# Patient Record
Sex: FEMALE | Race: BLACK OR AFRICAN AMERICAN | Employment: FULL TIME | URBAN - METROPOLITAN AREA
[De-identification: names, ages, dates, MRNs, and addresses within clinical notes are randomized per-mention and may not be internally consistent; named-entity substitution may affect disease eponyms.]

---

## 2017-06-06 DIAGNOSIS — E03.9 HYPOTHYROIDISM, UNSPECIFIED TYPE: ICD-10-CM

## 2017-06-06 DIAGNOSIS — E53.8 VITAMIN B12 DEFICIENCY (NON ANEMIC): ICD-10-CM

## 2017-06-06 DIAGNOSIS — E61.1 IRON DEFICIENCY: ICD-10-CM

## 2017-06-06 DIAGNOSIS — M79.89 SWELLING OF RIGHT LOWER EXTREMITY: ICD-10-CM

## 2017-06-06 DIAGNOSIS — E55.9 VITAMIN D DEFICIENCY: ICD-10-CM

## 2017-06-06 DIAGNOSIS — R53.82 CHRONIC FATIGUE: ICD-10-CM

## 2017-06-06 LAB
DEPRECATED CALCIDIOL+CALCIFEROL SERPL-MC: 27 UG/L (ref 20–75)
FERRITIN SERPL-MCNC: 33 NG/ML (ref 12–150)
IRON SATN MFR SERPL: 18 % (ref 15–46)
IRON SERPL-MCNC: 54 UG/DL (ref 35–180)
T4 FREE SERPL-MCNC: 0.82 NG/DL (ref 0.76–1.46)
TIBC SERPL-MCNC: 294 UG/DL (ref 240–430)
TSH SERPL DL<=0.05 MIU/L-ACNC: 2.09 MU/L (ref 0.4–4)
VIT B12 SERPL-MCNC: 365 PG/ML (ref 193–986)

## 2017-06-06 PROCEDURE — 83550 IRON BINDING TEST: CPT | Performed by: INTERNAL MEDICINE

## 2017-06-06 PROCEDURE — 83540 ASSAY OF IRON: CPT | Performed by: INTERNAL MEDICINE

## 2017-06-06 PROCEDURE — 82306 VITAMIN D 25 HYDROXY: CPT | Performed by: INTERNAL MEDICINE

## 2017-06-06 PROCEDURE — 36415 COLL VENOUS BLD VENIPUNCTURE: CPT | Performed by: INTERNAL MEDICINE

## 2017-06-06 PROCEDURE — 82728 ASSAY OF FERRITIN: CPT | Performed by: INTERNAL MEDICINE

## 2017-06-06 PROCEDURE — 84439 ASSAY OF FREE THYROXINE: CPT | Performed by: INTERNAL MEDICINE

## 2017-06-06 PROCEDURE — 82607 VITAMIN B-12: CPT | Performed by: INTERNAL MEDICINE

## 2017-06-06 PROCEDURE — 84443 ASSAY THYROID STIM HORMONE: CPT | Performed by: INTERNAL MEDICINE

## 2017-06-13 ENCOUNTER — OFFICE VISIT (OUTPATIENT)
Dept: INTERNAL MEDICINE | Facility: CLINIC | Age: 37
End: 2017-06-13
Payer: COMMERCIAL

## 2017-06-13 VITALS
TEMPERATURE: 97.8 F | HEIGHT: 67 IN | DIASTOLIC BLOOD PRESSURE: 84 MMHG | BODY MASS INDEX: 23.31 KG/M2 | WEIGHT: 148.5 LBS | HEART RATE: 56 BPM | SYSTOLIC BLOOD PRESSURE: 134 MMHG

## 2017-06-13 DIAGNOSIS — R53.82 CHRONIC FATIGUE: ICD-10-CM

## 2017-06-13 DIAGNOSIS — E53.8 VITAMIN B12 DEFICIENCY (NON ANEMIC): ICD-10-CM

## 2017-06-13 DIAGNOSIS — Z01.84 IMMUNITY STATUS TESTING: ICD-10-CM

## 2017-06-13 DIAGNOSIS — E55.9 VITAMIN D DEFICIENCY: ICD-10-CM

## 2017-06-13 DIAGNOSIS — Z91.148 NONCOMPLIANCE WITH MEDICATION REGIMEN: Primary | ICD-10-CM

## 2017-06-13 DIAGNOSIS — Z86.2 HISTORY OF IRON DEFICIENCY ANEMIA: ICD-10-CM

## 2017-06-13 PROCEDURE — 86762 RUBELLA ANTIBODY: CPT | Performed by: INTERNAL MEDICINE

## 2017-06-13 PROCEDURE — 86735 MUMPS ANTIBODY: CPT | Performed by: INTERNAL MEDICINE

## 2017-06-13 PROCEDURE — 86787 VARICELLA-ZOSTER ANTIBODY: CPT | Performed by: INTERNAL MEDICINE

## 2017-06-13 PROCEDURE — 99215 OFFICE O/P EST HI 40 MIN: CPT | Performed by: INTERNAL MEDICINE

## 2017-06-13 PROCEDURE — 86765 RUBEOLA ANTIBODY: CPT | Performed by: INTERNAL MEDICINE

## 2017-06-13 PROCEDURE — 36415 COLL VENOUS BLD VENIPUNCTURE: CPT | Performed by: INTERNAL MEDICINE

## 2017-06-13 NOTE — PROGRESS NOTES
"  SUBJECTIVE:                                                    Marla Avila is a 37 year old female who presents to clinic today for the following health issues:      Hypothyroidism Follow-up      Since last visit, patient describes the following symptoms: Weight stable, no hair loss, no skin changes, no constipation, no loose stools       Amount of exercise or physical activity: None    Problems taking medications regularly: Yes,  Stop all of her meds in january    Medication side effects: none  Diet: regular (no restrictions)    Of note she has been off her thyroid supplement and meds since about 6 months ago, citing the fact that she lost her meds, though she has refills through to August 2017.    Other significant issues as outlined and addressed in the plan section of this note.  She denies any other concerns or issues at this time.    She however comes in with request to have immunization forms filled. On review of records, she is immune to Hep b but the rest of her immune status is unknown.      Problem list and histories reviewed & adjusted, as indicated.  Additional history: as documented    Labs reviewed in EPIC    Reviewed and updated as needed this visit by clinical staff  Tobacco  Allergies  Meds       Reviewed and updated as needed this visit by Provider         ROS:  14 point ROS negative except as above      OBJECTIVE:                                                    /84  Pulse 56  Temp 97.8  F (36.6  C) (Oral)  Ht 5' 6.6\" (1.692 m)  Wt 148 lb 8 oz (67.4 kg)  LMP 05/07/2017  BMI 23.54 kg/m2  Body mass index is 23.54 kg/(m^2).  GENERAL: healthy, alert and no distress  NECK: no adenopathy, no asymmetry, masses, or scars and thyroid normal to palpation  RESP: lungs clear to auscultation - no rales, rhonchi or wheezes  CV: regular rate and rhythm, normal S1 S2, no S3 or S4, no murmur, click or rub, no peripheral edema and peripheral pulses strong  ABDOMEN: soft, nontender, no " hepatosplenomegaly, no masses and bowel sounds normal  MS: RLE edema, unchanged.    Diagnostic Test Results:  Results for orders placed or performed in visit on 06/06/17   Vitamin D Deficiency   Result Value Ref Range    Vitamin D Deficiency screening 27 20 - 75 ug/L   Ferritin   Result Value Ref Range    Ferritin 33 12 - 150 ng/mL   Vitamin B12   Result Value Ref Range    Vitamin B12 365 193 - 986 pg/mL   Iron and iron binding capacity   Result Value Ref Range    Iron 54 35 - 180 ug/dL    Iron Binding Cap 294 240 - 430 ug/dL    Iron Saturation Index 18 15 - 46 %   TSH   Result Value Ref Range    TSH 2.09 0.40 - 4.00 mU/L   T4 FREE   Result Value Ref Range    T4 Free 0.82 0.76 - 1.46 ng/dL        ASSESSMENT/PLAN:                                                    Hypothyroidism; controlled/euthyroid   Plan:  No changes in the patient's current treatment plan  Will have Marla continue off thyroid supplement for now and recheck labs in 6 months or sooner if she starts to feel fatigued      DIAGNOSIS/PLAN:     ICD-10-CM    1. Noncompliance with medication regimen Z91.14    2. Chronic fatigue R53.82 cholecalciferol 5000 UNITS CAPS     cholecalciferol (VITAMIN D3) 29141 UNITS capsule     Cyanocobalamin (B-12 SUPER STRENGTH) 5000 MCG/ML LIQD     Calcium Carb-Cholecalciferol (CALCIUM 1000 + D) 1000-800 MG-UNIT TABS   3. Vitamin D deficiency E55.9 cholecalciferol 5000 UNITS CAPS     cholecalciferol (VITAMIN D3) 73820 UNITS capsule     Calcium Carb-Cholecalciferol (CALCIUM 1000 + D) 1000-800 MG-UNIT TABS     Vitamin D Deficiency   4. Vitamin B12 deficiency (non anemic) E53.8 Cyanocobalamin (B-12 SUPER STRENGTH) 5000 MCG/ML LIQD     Vitamin B12   5. Immunity status testing Z01.84 Rubella Antibody IgG Quantitative     Rubeola Antibody IgG     Mumps Antibody IgG     Varicella Zoster Virus Antibody IgG   6. History of iron deficiency anemia Z86.2 Ferritin     CBC with platelets     Iron and iron binding capacity        SIGNIFICANT ISSUES RE The primary encounter diagnosis was Noncompliance with medication regimen. Diagnoses of Chronic fatigue, Vitamin D deficiency, Vitamin B12 deficiency (non anemic), Immunity status testing, and History of iron deficiency anemia were also pertinent to this visit. AS NOTED AND ADDRESSED ABOVE   MEDS AND AND LABS AS ORDERED TO ADDRESS PREVIOUS AND CURRENT ABNORMAL INDICES    SEE PATIENT INSTRUCTION SECTION FOR FOLLOW UP PLAN    Marla IS TO CONTINUE OTHER TREATMENT REGIMEN/PLANS EXCEPT AS INDICATED    COMPLIANCE WITH MEDICATIONS DIET AND EXERCISE PLANS ENCOURAGED    DISCONTINUED MEDS:  Medications Discontinued During This Encounter   Medication Reason     benzonatate (TESSALON PERLES) 100 MG capsule Therapy completed     ferrous fumarate 65 mg, [place]. FE,-Vitamin C 125 mg (VITRON C)  MG TABS      cholecalciferol 5000 UNITS CAPS Reorder     cyabnocobalamin (VITAMIN B-12) 2500 MCG sublingual tablet Reorder     levothyroxine (SYNTHROID, LEVOTHROID) 50 MCG tablet      Calcium Carb-Cholecalciferol (CALCIUM 1000 + D) 1000-800 MG-UNIT TABS Reorder       CURRENT MED LIST WITH CHANGES AS NOTED BELOW:  Current Outpatient Prescriptions   Medication Sig Dispense Refill     cholecalciferol 5000 UNITS CAPS Take 1 capsule (5,000 Units) by mouth daily FOR VITAMIN D DEFICIENCY (LOW VITAMIN D) 100 capsule 3     [START ON 6/15/2017] cholecalciferol (VITAMIN D3) 88901 UNITS capsule Take 1 capsule (50,000 Units) by mouth twice a week for 5 doses INDICATION: 2.5 WEEK BOOSTER DOSE TO CORRECT LOW VITAMIN D 5 capsule 0     Cyanocobalamin (B-12 SUPER STRENGTH) 5000 MCG/ML LIQD Place 1 mL under the tongue daily FOR VITAMIN B12 SUPPLEMENTATION, PLEASE PLACE UNDER THE TONGUE 2 Bottle PRN     Calcium Carb-Cholecalciferol (CALCIUM 1000 + D) 1000-800 MG-UNIT TABS Take 1 tablet by mouth daily TAKE WITH FOOD, FOR BONE HEALTH AND LOW VITAMIN D (SUKI MAJANO) 100 tablet PRN         Office visit time > 40  mins, greater than 50% of which was spent obtaining history, reviewing medications, counseling re compliance with treatment plan, discussion of treatment, follow up plans, and coordination of care.       OFFICE VISIT VISIT ACCOMPLISHED WITH THE HELP OF [place]     Patient Instructions   ** FOLLOW UP PLAN**:    PLEASE SCHEDULE FASTING LABS WITH OFFICE VISIT 6 MONTHS FROM TODAY TO FOLLOW UP ON  Chronic fatigue  Vitamin D deficiency  Vitamin B12 deficiency (non anemic)  History of iron deficiency anemia    BE SURE TO SCHEDULE YOUR LAB DRAW APPOINTMENT FOR AT LEAST 5 DAYS BEFORE YOUR NEXT VISIT        YOU MAY CONTACT THE CLINIC IF ANY QUESTIONS OR CONCERNS -[phone] OR VIA [employer]                 Candis Carlos MD  [facility]

## 2017-06-13 NOTE — PATIENT INSTRUCTIONS
** FOLLOW UP PLAN**:    PLEASE SCHEDULE FASTING LABS WITH OFFICE VISIT 6 MONTHS FROM TODAY TO FOLLOW UP ON  Chronic fatigue  Vitamin D deficiency  Vitamin B12 deficiency (non anemic)  History of iron deficiency anemia    BE SURE TO SCHEDULE YOUR LAB DRAW APPOINTMENT FOR AT LEAST 5 DAYS BEFORE YOUR NEXT VISIT        YOU MAY CONTACT THE CLINIC IF ANY QUESTIONS OR CONCERNS -[phone] OR VIA [facility]

## 2017-06-13 NOTE — MR AVS SNAPSHOT
After Visit Summary   6/13/2017    Marla Avila    MRN: [id number]           Patient Information     Date Of Birth          1980        Visit Information        Provider Department      6/13/2017 4:15 PM Candis Carlos MD; [employer] [facility]        Today's Diagnoses     Noncompliance with medication regimen    -  1    Chronic fatigue        Vitamin D deficiency        Vitamin B12 deficiency (non anemic)        Immunity status testing        History of iron deficiency anemia          Care Instructions    ** FOLLOW UP PLAN**:    PLEASE SCHEDULE FASTING LABS WITH OFFICE VISIT 6 MONTHS FROM TODAY TO FOLLOW UP ON  Chronic fatigue  Vitamin D deficiency  Vitamin B12 deficiency (non anemic)  History of iron deficiency anemia    BE SURE TO SCHEDULE YOUR LAB DRAW APPOINTMENT FOR AT LEAST 5 DAYS BEFORE YOUR NEXT VISIT        YOU MAY CONTACT THE CLINIC IF ANY QUESTIONS OR CONCERNS -[phone] OR VIA [employer]                     Follow-ups after your visit        Future tests that were ordered for you today     Open Future Orders        Priority Expected Expires Ordered    Vitamin D Deficiency Routine 6/13/2017 12/11/2017 6/13/2017    Ferritin Routine 6/13/2017 12/11/2017 6/13/2017    CBC with platelets Routine 6/13/2017 12/11/2017 6/13/2017    Iron and iron binding capacity Routine 6/13/2017 12/11/2017 6/13/2017    Vitamin B12 Routine 6/13/2017 12/11/2017 6/13/2017            Who to contact     If you have questions or need follow up information about today's clinic visit or your schedule please contact [facility] directly at [phone].  Normal or non-critical lab and imaging results will be communicated to you by MyChart, letter or phone within 4 business days after the clinic has received the results. If you do not hear from us within 7 days, please contact the clinic through [employer] or phone. If you have a critical or abnormal  "lab result, we will notify you by phone as soon as possible.  Submit refill requests through [employer] or call your pharmacy and they will forward the refill request to us. Please allow 3 business days for your refill to be completed.          Additional Information About Your Visit        Click Contacthart Information     [employer] lets you send messages to your doctor, view your test results, renew your prescriptions, schedule appointments and more. To sign up, go to [email or web address] . Click on \"Log in\" on the left side of the screen, which will take you to the Welcome page. Then click on \"Sign up Now\" on the right side of the page.     You will be asked to enter the access code listed below, as well as some personal information. Please follow the directions to create your username and password.     Your access code is: [email or web address]  Expires: 2017  5:49 PM     Your access code will  in 90 days. If you need help or a new code, please call your [place] clinic or [phone].        Care EveryWhere ID     This is your Care EveryWhere ID. This could be used by other organizations to access your [place] medical records  [id number]        Your Vitals Were     Pulse Temperature Height Last Period BMI (Body Mass Index)       56 97.8  F (36.6  C) (Oral) 5' 6.6\" (1.692 m) 2017 23.54 kg/m2        Blood Pressure from Last 3 Encounters:   17 134/84   16 120/88   16 120/80    Weight from Last 3 Encounters:   17 148 lb 8 oz (67.4 kg)   16 156 lb 8 oz (71 kg)   16 151 lb 6.4 oz (68.7 kg)              We Performed the Following     Mumps Antibody IgG     Rubella Antibody IgG Quantitative     Rubeola Antibody IgG     Varicella Zoster Virus Antibody IgG          Today's Medication Changes          These changes are accurate as of: 17  5:49 PM.  If you have any questions, ask your nurse or doctor.               Start taking these medicines.        Dose/Directions    " Cyanocobalamin 5000 MCG/ML Liqd   Commonly known as:  B-12 SUPER STRENGTH   Used for:  Chronic fatigue, Vitamin B12 deficiency (non anemic)   Replaces:  cyabnocobalamin 2500 MCG sublingual tablet   Started by:  Candis Carlos MD        Dose:  1 mL   Place 1 mL under the tongue daily FOR VITAMIN B12 SUPPLEMENTATION, PLEASE PLACE UNDER THE TONGUE   Quantity:  2 Bottle   Refills:  PRN         These medicines have changed or have updated prescriptions.        Dose/Directions    * cholecalciferol 5000 UNITS Caps   This may have changed:  additional instructions   Used for:  Chronic fatigue, Vitamin D deficiency   Changed by:  Candis Carlos MD        Dose:  1 capsule   Take 1 capsule (5,000 Units) by mouth daily FOR VITAMIN D DEFICIENCY (LOW VITAMIN D)   Quantity:  100 capsule   Refills:  3       * cholecalciferol 15826 UNITS capsule   Commonly known as:  VITAMIN D3   This may have changed:  You were already taking a medication with the same name, and this prescription was added. Make sure you understand how and when to take each.   Used for:  Chronic fatigue, Vitamin D deficiency   Changed by:  Candis Carlos MD        Dose:  1 capsule   Start taking on:  6/15/2017   Take 1 capsule (50,000 Units) by mouth twice a week for 5 doses INDICATION: 2.5 WEEK BOOSTER DOSE TO CORRECT LOW VITAMIN D   Quantity:  5 capsule   Refills:  0       * Notice:  This list has 2 medication(s) that are the same as other medications prescribed for you. Read the directions carefully, and ask your doctor or other care provider to review them with you.      Stop taking these medicines if you haven't already. Please contact your care team if you have questions.     cyabnocobalamin 2500 MCG sublingual tablet   Commonly known as:  VITAMIN B-12   Replaced by:  Cyanocobalamin 5000 MCG/ML Liqd   Stopped by:  Candis Carlos MD           ferrous fumarate 65 mg ([place]. FE)-Vitamin C 125 mg  MG Tabs tablet   Commonly known as:   VITRON C   Stopped by:  Candis Carlos MD           levothyroxine 50 MCG tablet   Commonly known as:  SYNTHROID/LEVOTHROID   Stopped by:  Candis Carlos MD                Where to get your medicines      These medications were sent to [facility] [address]  [address] [address]     Phone:  [phone]     Calcium Carb-Cholecalciferol 1000-800 MG-UNIT Tabs    cholecalciferol 5000 UNITS Caps    cholecalciferol 02646 UNITS capsule    Cyanocobalamin 5000 MCG/ML Liqd                Primary Care Provider Office Phone # Fax #    Candis Carols [phone]       [facility] [address] [facility] [address]        Thank you!     Thank you for choosing [facility]  for your care. Our goal is always to provide you with excellent care. Hearing back from our patients is one way we can continue to improve our services. Please take a few minutes to complete the written survey that you may receive in the mail after your visit with us. Thank you!             Your Updated Medication List - Protect others around you: Learn how to safely use, store and throw away your medicines at www.disposemymeds.org.          This list is accurate as of: 6/13/17  5:49 PM.  Always use your most recent med list.                   Brand Name Dispense Instructions for use    Calcium Carb-Cholecalciferol 1000-800 MG-UNIT Tabs    CALCIUM 1000 + D    100 tablet    Take 1 tablet by mouth daily TAKE WITH FOOD, FOR BONE HEALTH AND LOW VITAMIN D ([facility])       * cholecalciferol 5000 UNITS Caps     100 capsule    Take 1 capsule (5,000 Units) by mouth daily FOR VITAMIN D DEFICIENCY (LOW VITAMIN D)       * cholecalciferol 27414 UNITS capsule   Start taking on:  6/15/2017    VITAMIN D3    5 capsule    Take 1 capsule (50,000 Units) by mouth twice a week for 5 doses INDICATION: 2.5 WEEK BOOSTER DOSE TO CORRECT LOW  VITAMIN D       Cyanocobalamin 5000 MCG/ML Liqd    B-12 SUPER STRENGTH    2 Bottle    Place 1 mL under the tongue daily FOR VITAMIN B12 SUPPLEMENTATION, PLEASE PLACE UNDER THE TONGUE       * Notice:  This list has 2 medication(s) that are the same as other medications prescribed for you. Read the directions carefully, and ask your doctor or other care provider to review them with you.

## 2017-06-15 LAB
MEV IGG SER QL IA: 4.7 AI (ref 0–0.8)
MUV IGG SER QL IA: 3.9 AI (ref 0–0.8)
RUBV IGG SERPL IA-ACNC: 72 IU/ML
VZV IGG SER QL IA: 2.8 AI (ref 0–0.8)

## 2017-06-26 ENCOUNTER — TELEPHONE (OUTPATIENT)
Dept: INTERNAL MEDICINE | Facility: CLINIC | Age: 37
End: 2017-06-26

## 2017-06-26 NOTE — TELEPHONE ENCOUNTER
Reason for call:  Other   Patient called regarding (reason for call): Wants to get a school physical before July for school, did explain to her we have no opening, but we could do the TB test a nurse only, wants to only see dr vinson, doesn't want to wait till sept. To be seen  Additional comments: Please call patient to discuss this with her, as she did not want to hear what I told her.     Phone number to reach patient:  Home number on file [phone] (home)    Best Time:  anytime    Can we leave a detailed message on this number?  YES

## 2017-07-03 ENCOUNTER — OFFICE VISIT (OUTPATIENT)
Dept: INTERNAL MEDICINE | Facility: CLINIC | Age: 37
End: 2017-07-03
Payer: COMMERCIAL

## 2017-07-03 ENCOUNTER — RADIANT APPOINTMENT (OUTPATIENT)
Dept: GENERAL RADIOLOGY | Facility: CLINIC | Age: 37
End: 2017-07-03
Attending: INTERNAL MEDICINE
Payer: COMMERCIAL

## 2017-07-03 VITALS
BODY MASS INDEX: 25.3 KG/M2 | DIASTOLIC BLOOD PRESSURE: 78 MMHG | RESPIRATION RATE: 20 BRPM | OXYGEN SATURATION: 99 % | TEMPERATURE: 97.8 F | WEIGHT: 157.4 LBS | HEIGHT: 66 IN | HEART RATE: 87 BPM | SYSTOLIC BLOOD PRESSURE: 128 MMHG

## 2017-07-03 DIAGNOSIS — Z11.1 SCREENING EXAMINATION FOR PULMONARY TUBERCULOSIS: ICD-10-CM

## 2017-07-03 DIAGNOSIS — Z00.00 ENCOUNTER FOR ROUTINE ADULT HEALTH EXAMINATION WITHOUT ABNORMAL FINDINGS: Primary | ICD-10-CM

## 2017-07-03 PROCEDURE — 99395 PREV VISIT EST AGE 18-39: CPT | Performed by: INTERNAL MEDICINE

## 2017-07-03 PROCEDURE — 71020 XR CHEST 2 VW: CPT

## 2017-07-03 NOTE — MR AVS SNAPSHOT
After Visit Summary   7/3/2017    Marla Avila    MRN: [id number]           Patient Information     Date Of Birth          1980        Visit Information        Provider Department      7/3/2017 10:45 AM Candis Carlos MD; [employer] [employer] [facility]        Today's Diagnoses     Encounter for routine adult health examination without abnormal findings    -  1    Screening examination for pulmonary tuberculosis          Care Instructions    FOLLOW UP FOR ROUTINE CARE AS SCHEDULED      Preventive Health Recommendations  Female Ages 26 - 39  Yearly exam:   See your health care provider every year in order to    Review health changes.     Discuss preventive care.      Review your medicines if you your doctor has prescribed any.    Until age 30: Get a Pap test every three years (more often if you have had an abnormal result).    After age 30: Talk to your doctor about whether you should have a Pap test every 3 years or have a Pap test with HPV screening every 5 years.   You do not need a Pap test if your uterus was removed (hysterectomy) and you have not had cancer.  You should be tested each year for STDs (sexually transmitted diseases), if you're at risk.   Talk to your provider about how often to have your cholesterol checked.  If you are at risk for diabetes, you should have a diabetes test (fasting glucose).  Shots: Get a flu shot each year. Get a tetanus shot every 10 years.   Nutrition:     Eat at least 5 servings of fruits and vegetables each day.    Eat whole-grain bread, whole-wheat pasta and brown rice instead of white grains and rice.    Talk to your provider about Calcium and Vitamin D.     Lifestyle    Exercise at least 150 minutes a week (30 minutes a day, 5 days of the week). This will help you control your weight and prevent disease.    Limit alcohol to one drink per day.    No smoking.     Wear sunscreen to prevent skin cancer.    See your  "dentist every six months for an exam and cleaning.            Follow-ups after your visit        Who to contact     If you have questions or need follow up information about today's clinic visit or your schedule please contact [facility] directly at [phone].  Normal or non-critical lab and imaging results will be communicated to you by MyChart, letter or phone within 4 business days after the clinic has received the results. If you do not hear from us within 7 days, please contact the clinic through MyChart or phone. If you have a critical or abnormal lab result, we will notify you by phone as soon as possible.  Submit refill requests through [employer] or call your pharmacy and they will forward the refill request to us. Please allow 3 business days for your refill to be completed.          Additional Information About Your Visit        MyChart Information     [employer] lets you send messages to your doctor, view your test results, renew your prescriptions, schedule appointments and more. To sign up, go to [email or web address] . Click on \"Log in\" on the left side of the screen, which will take you to the Welcome page. Then click on \"Sign up Now\" on the right side of the page.     You will be asked to enter the access code listed below, as well as some personal information. Please follow the directions to create your username and password.     Your access code is: [email or web address]  Expires: 2017  5:49 PM     Your access code will  in 90 days. If you need help or a new code, please call your [place] clinic or [phone].        Care EveryWhere ID     This is your Care EveryWhere ID. This could be used by other organizations to access your [place] medical records  [id number]        Your Vitals Were     Pulse Temperature Respirations Height Last Period Pulse Oximetry    87 97.8  F (36.6  C) (Oral) 20 5' 6\" (1.676 m) 2017 99%    BMI (Body Mass Index)                   25.41 " kg/m2            Blood Pressure from Last 3 Encounters:   07/03/17 128/78   06/13/17 134/84   09/29/16 120/88    Weight from Last 3 Encounters:   07/03/17 157 lb 6.4 oz (71.4 kg)   06/13/17 148 lb 8 oz (67.4 kg)   09/29/16 156 lb 8 oz (71 kg)              We Performed the Following     TB INTRADERMAL TEST        Primary Care Provider Office Phone # Fax #    Candis Carlos [phone]       [facility] [address] [facility] [address]        Equal Access to Services     [facility] : Hadii micah Lozano, waaxda enrico, qaybta kaalmada celina, levi correia . So [facility] [phone].    ATENCIÓN: Si habla español, tiene a mcgraw disposición servicios gratuitos de asistencia lingüística. Llame al [phone].    We comply with applicable federal civil rights laws and [place] laws. We do not discriminate on the basis of race, color, national origin, age, disability sex, sexual orientation or gender identity.            Thank you!     Thank you for choosing [facility]  for your care. Our goal is always to provide you with excellent care. Hearing back from our patients is one way we can continue to improve our services. Please take a few minutes to complete the written survey that you may receive in the mail after your visit with us. Thank you!             Your Updated Medication List - Protect others around you: Learn how to safely use, store and throw away your medicines at www.disposemymeds.org.          This list is accurate as of: 7/3/17 12:05 PM.  Always use your most recent med list.                   Brand Name Dispense Instructions for use Diagnosis    Calcium Carb-Cholecalciferol 1000-800 MG-UNIT Tabs    CALCIUM 1000 + D    100 tablet    Take 1 tablet by mouth daily TAKE WITH FOOD, FOR BONE HEALTH AND LOW VITAMIN D (SAUD MAJANO)    Chronic fatigue, Vitamin D deficiency       cholecalciferol 5000  UNITS Caps     100 capsule    Take 1 capsule (5,000 Units) by mouth daily FOR VITAMIN D DEFICIENCY (LOW VITAMIN D)    Chronic fatigue, Vitamin D deficiency       Cyanocobalamin 5000 MCG/ML Liqd    B-12 SUPER STRENGTH    2 Bottle    Place 1 mL under the tongue daily FOR VITAMIN B12 SUPPLEMENTATION, PLEASE PLACE UNDER THE TONGUE    Chronic fatigue, Vitamin B12 deficiency (non anemic)

## 2017-07-03 NOTE — PATIENT INSTRUCTIONS
FOLLOW UP FOR ROUTINE CARE AS SCHEDULED      Preventive Health Recommendations  Female Ages 26 - 39  Yearly exam:   See your health care provider every year in order to    Review health changes.     Discuss preventive care.      Review your medicines if you your doctor has prescribed any.    Until age 30: Get a Pap test every three years (more often if you have had an abnormal result).    After age 30: Talk to your doctor about whether you should have a Pap test every 3 years or have a Pap test with HPV screening every 5 years.   You do not need a Pap test if your uterus was removed (hysterectomy) and you have not had cancer.  You should be tested each year for STDs (sexually transmitted diseases), if you're at risk.   Talk to your provider about how often to have your cholesterol checked.  If you are at risk for diabetes, you should have a diabetes test (fasting glucose).  Shots: Get a flu shot each year. Get a tetanus shot every 10 years.   Nutrition:     Eat at least 5 servings of fruits and vegetables each day.    Eat whole-grain bread, whole-wheat pasta and brown rice instead of white grains and rice.    Talk to your provider about Calcium and Vitamin D.     Lifestyle    Exercise at least 150 minutes a week (30 minutes a day, 5 days of the week). This will help you control your weight and prevent disease.    Limit alcohol to one drink per day.    No smoking.     Wear sunscreen to prevent skin cancer.    See your dentist every six months for an exam and cleaning.

## 2017-07-03 NOTE — NURSING NOTE
"Chief Complaint   Patient presents with     Physical     school       Initial /78  Pulse 87  Temp 97.8  F (36.6  C) (Oral)  Resp 20  Ht 5' 6\" (1.676 m)  Wt 157 lb 6.4 oz (71.4 kg)  LMP 05/07/2017  SpO2 99%  BMI 25.41 kg/m2 Estimated body mass index is 25.41 kg/(m^2) as calculated from the following:    Height as of this encounter: 5' 6\" (1.676 m).    Weight as of this encounter: 157 lb 6.4 oz (71.4 kg).  Blood pressure completed using cuff size: regular    "

## 2017-07-03 NOTE — PROGRESS NOTES
"   SUBJECTIVE:   CC: Marla Avila is an 37 year old woman who presents for preventive health visit.     Healthy Habits:    Do you get at least three servings of calcium containing foods daily (dairy, green leafy vegetables, etc.)? no    Amount of exercise or daily activities, outside of work: 7 day(s) per week    Problems taking medications regularly No    Medication side effects: No    Have you had an eye exam in the past two years? yes    Do you see a dentist twice per year? no    Do you have sleep apnea, excessive snoring or daytime drowsiness?no        She denies any other concerns or issues at this time.      Immunity: status is unknown.      Today's PHQ-2 Score:   PHQ-2 ( 1999 Pf[employer]) 6/13/2017 8/26/2016   Q1: Little interest or pleasure in doing things 0 0   Q2: Feeling down, depressed or hopeless 0 0   PHQ-2 Score 0 0         Abuse: Current or Past(Physical, Sexual or Emotional)- No  Do you feel safe in your environment - Yes    Social History   Substance Use Topics     Smoking status: Never Smoker     Smokeless tobacco: Never Used     Alcohol use No     none    Reviewed orders with patient.  Reviewed health maintenance and updated orders accordingly - Yes  Labs reviewed in EPIC    Mammogram not appropriate for this patient based on age.    Pertinent mammograms are reviewed under the imaging tab.  History of abnormal Pap smear: NO - age 30- 65 PAP every 3 years recommended    Reviewed and updated as needed this visit by clinical staff  Tobacco  Allergies  Meds  Med Hx  Surg Hx  Fam Hx  Soc Hx        Reviewed and updated as needed this visit by Provider        Past Medical History:   Diagnosis Date     Hypothyroidism      Low back pain      Lymphedema         ROS:  14 point ROS negative except as above      OBJECTIVE:   /78  Pulse 87  Temp 97.8  F (36.6  C) (Oral)  Resp 20  Ht 5' 6\" (1.676 m)  Wt 157 lb 6.4 oz (71.4 kg)  LMP 05/07/2017  SpO2 99%  BMI 25.41 kg/m2  EXAM:  GENERAL: healthy, " alert and no distress  EYES: Eyes grossly normal to inspection, PERRL and conjunctivae and sclerae normal  NECK: no adenopathy, no asymmetry, masses, or scars and thyroid normal to palpation  RESP: lungs clear to auscultation - no rales, rhonchi or wheezes  CV: regular rate and rhythm, normal S1 S2, no S3 or S4, no murmur, click or rub, no peripheral edema and peripheral pulses strong  ABDOMEN: soft, nontender, no hepatosplenomegaly, no masses and bowel sounds normal  MS: no gross musculoskeletal defects noted, no edema    ASSESSMENT/PLAN:     PREVENTATIVE:   The importance of routine health maintenance including exercise, healthy eating habits, maintaining good mental, sexual health, SBE and need for regular pap smears as indicated is emphasized.    PROBLEM FOCUSED:    ICD-10-CM    1. Encounter for routine adult health examination without abnormal findings Z00.00    2. Screening examination for pulmonary tuberculosis Z11.1 XR Chest 2 Views     CANCELED: TB INTRADERMAL TEST       SIGNIFICANT ISSUES RE The primary encounter diagnosis was Encounter for routine adult health examination without abnormal findings. A diagnosis of Screening examination for pulmonary tuberculosis was also pertinent to this visit. AS NOTED AND ADDRESSED ABOVE   MEDS AND LABS AS ORDERED TO ADDRESS PREVIOUSLY ABNORMAL INDICES    SEE PATIENT INSTRUCTION SECTION FOR FOLLOW UP PLAN    PT TO CONTINUE OTHER TREATMENT REGIMEN/PLANS EXCEPT AS INDICATED    COMPLIANCE WITH MEDICATIONS DIET AND EXERCISE PLANS ENCOURAGED    DISCONTINUED MEDS:  There are no discontinued medications.    CURRENT MED LIST WITH CHANGES AS NOTED BELOW:  Current Outpatient Prescriptions   Medication Sig Dispense Refill     cholecalciferol 5000 UNITS CAPS Take 1 capsule (5,000 Units) by mouth daily FOR VITAMIN D DEFICIENCY (LOW VITAMIN D) 100 capsule 3     Cyanocobalamin (B-12 SUPER STRENGTH) 5000 MCG/ML LIQD Place 1 mL under the tongue daily FOR VITAMIN B12 SUPPLEMENTATION,  "PLEASE PLACE UNDER THE TONGUE 2 Bottle PRN     Calcium Carb-Cholecalciferol (CALCIUM 1000 + D) 1000-800 MG-UNIT TABS Take 1 tablet by mouth daily TAKE WITH FOOD, FOR BONE HEALTH AND LOW VITAMIN D (SAUD MAJANO) 100 tablet PRN         COUNSELING:   Reviewed preventive health counseling, as reflected in patient instructions       Regular exercise       Healthy diet/nutrition         reports that she has never smoked. She has never used smokeless tobacco.    Estimated body mass index is 25.41 kg/(m^2) as calculated from the following:    Height as of this encounter: 5' 6\" (1.676 m).    Weight as of this encounter: 157 lb 6.4 oz (71.4 kg).       Counseling Resources:  ATP IV Guidelines  Pooled Cohorts Equation Calculator  Breast Cancer Risk Calculator  FRAX Risk Assessment  ICSI Preventive Guidelines  Dietary Guidelines for Americans, 2010  USDA's MyPlate  ASA Prophylaxis  Lung CA Screening    Candis Carlos MD  [facility]  "

## 2017-11-15 ENCOUNTER — OFFICE VISIT (OUTPATIENT)
Dept: INTERNAL MEDICINE | Facility: CLINIC | Age: 37
End: 2017-11-15
Payer: COMMERCIAL

## 2017-11-15 VITALS
DIASTOLIC BLOOD PRESSURE: 70 MMHG | TEMPERATURE: 98.2 F | WEIGHT: 161 LBS | BODY MASS INDEX: 25.99 KG/M2 | OXYGEN SATURATION: 98 % | RESPIRATION RATE: 16 BRPM | SYSTOLIC BLOOD PRESSURE: 114 MMHG | HEART RATE: 62 BPM

## 2017-11-15 DIAGNOSIS — N91.2 AMENORRHEA: ICD-10-CM

## 2017-11-15 DIAGNOSIS — R19.00 ABDOMINAL SWELLING: Primary | ICD-10-CM

## 2017-11-15 LAB
BETA HCG QUAL IFA URINE: NEGATIVE
ERYTHROCYTE [DISTWIDTH] IN BLOOD BY AUTOMATED COUNT: 12.5 % (ref 10–15)
HCT VFR BLD AUTO: 42.2 % (ref 35–47)
HGB BLD-MCNC: 14.1 G/DL (ref 11.7–15.7)
MCH RBC QN AUTO: 30.1 PG (ref 26.5–33)
MCHC RBC AUTO-ENTMCNC: 33.4 G/DL (ref 31.5–36.5)
MCV RBC AUTO: 90 FL (ref 78–100)
PLATELET # BLD AUTO: 156 10E9/L (ref 150–450)
RBC # BLD AUTO: 4.68 10E12/L (ref 3.8–5.2)
WBC # BLD AUTO: 4 10E9/L (ref 4–11)

## 2017-11-15 PROCEDURE — 85027 COMPLETE CBC AUTOMATED: CPT | Performed by: PHYSICIAN ASSISTANT

## 2017-11-15 PROCEDURE — 36415 COLL VENOUS BLD VENIPUNCTURE: CPT | Performed by: PHYSICIAN ASSISTANT

## 2017-11-15 PROCEDURE — 84703 CHORIONIC GONADOTROPIN ASSAY: CPT | Performed by: PHYSICIAN ASSISTANT

## 2017-11-15 PROCEDURE — 99214 OFFICE O/P EST MOD 30 MIN: CPT | Performed by: PHYSICIAN ASSISTANT

## 2017-11-15 PROCEDURE — 80053 COMPREHEN METABOLIC PANEL: CPT | Performed by: PHYSICIAN ASSISTANT

## 2017-11-15 NOTE — NURSING NOTE
"Chief Complaint   Patient presents with     Musculoskeletal Problem       Initial /70 (BP Location: Left arm, Cuff Size: Adult Regular)  Pulse 62  Temp 98.2  F (36.8  C) (Oral)  Resp 16  Wt 161 lb (73 kg)  SpO2 98%  BMI 25.99 kg/m2 Estimated body mass index is 25.99 kg/(m^2) as calculated from the following:    Height as of 7/3/17: 5' 6\" (1.676 m).    Weight as of this encounter: 161 lb (73 kg).  Medication Reconciliation: complete   Maryan Hernández MA      "

## 2017-11-15 NOTE — PROGRESS NOTES
"  SUBJECTIVE:   Marla Avila is a 37 year old female who presents to clinic today for the following health issues:      Leg pain      Duration: 5 years    Description (location/character/radiation): swelling in right leg     Intensity:  moderate    Accompanying signs and symptoms: discomfort in right hip x2 weeks    History (similar episodes/previous evaluation): multiple scans of leg taht have showed nothing concerning     Precipitating or alleviating factors: bending    Therapies tried and outcome: None   Pt notes the leg swelling is not new- it is the same that it has been for years.  She notes she now has new swelling on the right side of her abdomen.   She denies that it is painful.  Pt also notes she has not had a period since May.  She had a pregnancy test at an outside facility in October that was negative.   Pt reports a 10 lb weight gain.   Pt notes some constipation.   She denies urinary symptoms.   She has no further concerns for today's visit.       Summary from last vascular visit in June 2016  \"1. Lymphedema of right lower extremity:  On review of pt's chart she has been seen by vascular in the past for right lymphedema.  She was seen in June 2016.   She has a unilateral right lower extremity swelling with 1+ pitting edema .clinical exam consistent with lymphedema .  Loss of leg Contour and buffalo hump of dorsum of right foot .squaring of the toes and positive stammer sign .  No varicose veins .She  Was born and brought up in [facility] .no family history for congenital lymphedema .no constitutional symptoms .previous right lower extremity venous Dopplers negative for DVT and abdominal and pelvic ultrasound was unremarkable .Two days ago CT scan of the abdomen and pelvis negative. No injury to the leg .  Compression stockings suggested and arrange referral to seen lymphedema clinic . Repeat  right lower extremity venous Dopplers negative for DVT     Plan:  Continue Rx with lymphedema clinic   " "lymphoscinography  Referral to PMR Dr. Cavanaugh .  Use compression , elevate leg\"    Problem list and histories reviewed & adjusted, as indicated.  Additional history: as documented    Reviewed and updated as needed this visit by clinical staffTobacco  Allergies  Meds  Problems       Reviewed and updated as needed this visit by Provider  Allergies  Meds  Problems           OBJECTIVE:   /70 (BP Location: Left arm, Cuff Size: Adult Regular)  Pulse 62  Temp 98.2  F (36.8  C) (Oral)  Resp 16  Wt 161 lb (73 kg)  SpO2 98%  BMI 25.99 kg/m2  Body mass index is 25.99 kg/(m^2).  GENERAL: healthy, alert and no distress  CV: regular rates and rhythm, normal S1 S2, no S3 or S4 and no murmur, click or rub  ABDOMEN:   Swelling noted on right suprapubic abdomen -distinct to area from pubic bone to ASIS with a width of several intact- soft and non-tender  Otherwise abdomen is somewhat firm, nontender, no hepatosplenomegaly, no masses and bowel sounds normal  Right Lower extremity with 1+ pitting edema without varicosities, erythema, warmth or pain.     ASSESSMENT/PLAN:     36 yo F with history of lymphedema on the right leg who has seen vascular with complete evaluation who presents with right-sided abdominal swelling without tenderness or other accompanying symptoms.    1. Abdominal swelling  2. Amenorrhea  - US Abdomen Complete; Future  - CBC with platelets  - Comprehensive metabolic panel  - Beta HCG qual IFA urine      Prema DONTRELL Crowder PA-C  [facility]    "

## 2017-11-16 LAB
ALBUMIN SERPL-MCNC: 4.1 G/DL (ref 3.4–5)
ALP SERPL-CCNC: 79 U/L (ref 40–150)
ALT SERPL W P-5'-P-CCNC: 18 U/L (ref 0–50)
ANION GAP SERPL CALCULATED.3IONS-SCNC: 6 MMOL/L (ref 3–14)
AST SERPL W P-5'-P-CCNC: 13 U/L (ref 0–45)
BILIRUB SERPL-MCNC: 0.4 MG/DL (ref 0.2–1.3)
BUN SERPL-MCNC: 14 MG/DL (ref 7–30)
CALCIUM SERPL-MCNC: 9.8 MG/DL (ref 8.5–10.1)
CHLORIDE SERPL-SCNC: 105 MMOL/L (ref 94–109)
CO2 SERPL-SCNC: 29 MMOL/L (ref 20–32)
CREAT SERPL-MCNC: 0.65 MG/DL (ref 0.52–1.04)
GFR SERPL CREATININE-BSD FRML MDRD: >90 ML/MIN/1.7M2
GLUCOSE SERPL-MCNC: 97 MG/DL (ref 70–99)
POTASSIUM SERPL-SCNC: 3.7 MMOL/L (ref 3.4–5.3)
PROT SERPL-MCNC: 8 G/DL (ref 6.8–8.8)
SODIUM SERPL-SCNC: 140 MMOL/L (ref 133–144)

## 2017-11-17 ENCOUNTER — TELEPHONE (OUTPATIENT)
Dept: INTERNAL MEDICINE | Facility: CLINIC | Age: 37
End: 2017-11-17

## 2017-11-17 DIAGNOSIS — R19.8 FULLNESS OF ABDOMEN: Primary | ICD-10-CM

## 2017-11-17 NOTE — TELEPHONE ENCOUNTER
Pt calling waiting for U/S to be ordered and results . Gave results since normal and reordered ultrasound at hospital . If meant to have here at clinic . Please call pt .Anjali Jama RN

## 2017-11-27 ENCOUNTER — HOSPITAL ENCOUNTER (OUTPATIENT)
Dept: ULTRASOUND IMAGING | Facility: CLINIC | Age: 37
Discharge: HOME | End: 2017-11-27
Attending: PHYSICIAN ASSISTANT | Admitting: PHYSICIAN ASSISTANT
Payer: COMMERCIAL

## 2017-11-27 DIAGNOSIS — R19.8 FULLNESS OF ABDOMEN: ICD-10-CM

## 2017-11-27 PROCEDURE — 76700 US EXAM ABDOM COMPLETE: CPT

## 2018-12-20 ENCOUNTER — OFFICE VISIT (OUTPATIENT)
Dept: FAMILY MEDICINE | Facility: CLINIC | Age: 38
End: 2018-12-20

## 2018-12-20 VITALS
DIASTOLIC BLOOD PRESSURE: 83 MMHG | WEIGHT: 158 LBS | TEMPERATURE: 98.7 F | HEART RATE: 75 BPM | SYSTOLIC BLOOD PRESSURE: 136 MMHG | OXYGEN SATURATION: 99 % | BODY MASS INDEX: 25.39 KG/M2 | HEIGHT: 66 IN

## 2018-12-20 DIAGNOSIS — J02.9 SORE THROAT: Primary | ICD-10-CM

## 2018-12-20 DIAGNOSIS — J02.0 STREPTOCOCCAL PHARYNGITIS: ICD-10-CM

## 2018-12-20 LAB
DEPRECATED S PYO AG THROAT QL EIA: ABNORMAL
SPECIMEN SOURCE: ABNORMAL

## 2018-12-20 PROCEDURE — 87880 STREP A ASSAY W/OPTIC: CPT | Performed by: FAMILY MEDICINE

## 2018-12-20 PROCEDURE — 99213 OFFICE O/P EST LOW 20 MIN: CPT | Performed by: FAMILY MEDICINE

## 2018-12-20 RX ORDER — AMOXICILLIN 875 MG
875 TABLET ORAL 2 TIMES DAILY
Qty: 20 TABLET | Refills: 0 | Status: SHIPPED | OUTPATIENT
Start: 2018-12-20 | End: 2018-12-30

## 2018-12-20 ASSESSMENT — MIFFLIN-ST. JEOR: SCORE: 1413.43

## 2018-12-20 NOTE — PATIENT INSTRUCTIONS
Patient Education     Pharyngitis: Strep (Confirmed)    You have had a positive test for strep throat. Strep throat is a contagious illness. It is spread by coughing, kissing or by touching others after touching your mouth or nose. Symptoms include throat pain that is worse with swallowing, aching all over, headache, and fever. It is treated with antibiotic medicine. This should help you start to feel better in 1 to 2 days.  Home care    Rest at home. Drink plenty of fluids to you won't get dehydrated.    No work or school for the first 2 days of taking the antibiotics. After this time, you will not be contagious. You can then return to school or work if you are feeling better.     Take antibiotic medicine for the full 10 days, even if you feel better. This is very important to ensure the infection is treated. It is also important to prevent medicine-resistant germs from developing. If you were given an antibiotic shot, you don't need any more antibiotics.    You may use acetaminophen or ibuprofen to control pain or fever, unless another medicine was prescribed for this. Talk with your healthcare provider before taking these medicines if you have chronic liver or kidney disease. Also talk with your healthcare provider if you have had a stomach ulcer or GI bleeding.    Throat lozenges or sprays help reduce pain. Gargling with warm saltwater will also reduce throat pain. Dissolve 1/2 teaspoon of salt in 1 glass of warm water. This may be useful just before meals.     Soft foods are OK. Don't eat salty or spicy foods.  Follow-up care  Follow up with your healthcare provider or our staff if you don't get better over the next week.  When to seek medical advice  Call your healthcare provider right away if any of these occur:    Fever of 100.4 F (38 C) or higher, or as directed by your healthcare provider    New or worsening ear pain, sinus pain, or headache    Painful lumps in the back of neck    Stiff neck    Lymph  nodes getting larger or becoming soft in the middle    You can't swallow liquids or you can't open your mouth wide because of throat pain    Signs of dehydration. These include very dark urine or no urine, sunken eyes, and dizziness.    Trouble breathing or noisy breathing    Muffled voice    Rash  Prevention  Here are steps you can take to help prevent an infection:    Keep good hand washing habits.    Don t have close contact with people who have sore throats, colds, or other upper respiratory infections.    Don t smoke, and stay away from secondhand smoke.  Date Last Reviewed: 11/1/2017 2000-2018 [employer]. [address] [place] [address], [place] [address]. All rights reserved. This information is not intended as a substitute for professional medical care. Always follow your healthcare professional's instructions.

## 2018-12-20 NOTE — PROGRESS NOTES
"  SUBJECTIVE:   Marla Avila is a 38 year old female who presents to clinic today for the following health issues:      Acute Illness   Acute illness concerns: Pharyngitis   Onset: x 1 week     Fever: last week better now     Chills/Sweats: no     Headache (location?): no     Sinus Pressure:no    Conjunctivitis:  no    Ear Pain: YES- right     Rhinorrhea: no    Congestion: YES    Sore Throat: YES     Cough: no    Wheeze: no     Decreased Appetite: no     Nausea: no     Vomiting: no     Diarrhea:  no     Dysuria/Freq.: no     Fatigue/Achiness: no     Sick/Strep Exposure: no      Therapies Tried and outcome:       PROBLEMS TO ADD ON...    Problem list and histories reviewed & adjusted, as indicated.  Additional history: as documented    Patient Active Problem List   Diagnosis     Anovulation     Low back pain     Lumbago     Swelling of right lower extremity     Chronic fatigue     Vitamin D deficiency     Vitamin B12 deficiency (non anemic)     Myalgia     CARDIOVASCULAR SCREENING; LDL GOAL LESS THAN 160     History of iron deficiency anemia     Primary hypothyroidism     Iron deficiency     Non-compliance with treatment     Past Surgical History:   Procedure Laterality Date     NO         Social History     Tobacco Use     Smoking status: Never Smoker     Smokeless tobacco: Never Used   Substance Use Topics     Alcohol use: No     Alcohol/week: 0.0 oz     Family History   Problem Relation Age of Onset     Hypertension Paternal Grandfather            Reviewed and updated as needed this visit by clinical staff       Reviewed and updated as needed this visit by Provider         ROS:  Constitutional, HEENT, cardiovascular, pulmonary, GI, , musculoskeletal, neuro, skin, endocrine and psych systems are negative, except as otherwise noted.    OBJECTIVE:     /83   Pulse 75   Temp 98.7  F (37.1  C) (Tympanic)   Ht 1.676 m (5' 6\")   Wt 71.7 kg (158 lb)   LMP 05/20/2017   SpO2 99%   BMI 25.50 kg/m    Body mass " index is 25.5 kg/m .  GENERAL: healthy, alert and no distress  EYES: Eyes grossly normal to inspection,   HENT: ear canals and TM's normal and oral mucous membranes moist. Throat with mild pharyngeal erythema slight exudate on tonsils but not too enlarged , no sinus  tenderness  NECK: no adenopathy,   RESP: lungs clear to auscultation - no rales, rhonchi or wheezes  CV: regular rate and rhythm, normal S1 S2, no S3 or S4,       Diagnostic Test Results:  Strep screen - Positive    ASSESSMENT/PLAN:         (J02.9) Sore throat  (primary encounter diagnosis)  Comment:   Plan: Strep, Rapid Screen            (J02.0) Streptococcal pharyngitis  Comment:   Plan: amoxicillin (AMOXIL) 875 MG tablet              Rapid strep positive. Script faxed. Call and treat only if positive. In the meantime cares and symptomatic treatment discussed follow up if problem       Patient expressed understanding and agreement with treatment plan. All patient's questions were answered, will let me know if has more later.  Medications: Rx's: Reviewed the potential side effects/complications of medications prescribed.     Cely Good MD  [facility] [place]

## 2019-01-15 ENCOUNTER — OFFICE VISIT (OUTPATIENT)
Dept: FAMILY MEDICINE | Facility: CLINIC | Age: 39
End: 2019-01-15

## 2019-01-15 VITALS
BODY MASS INDEX: 26.2 KG/M2 | HEIGHT: 66 IN | TEMPERATURE: 98.4 F | OXYGEN SATURATION: 99 % | HEART RATE: 83 BPM | DIASTOLIC BLOOD PRESSURE: 88 MMHG | WEIGHT: 163 LBS | SYSTOLIC BLOOD PRESSURE: 130 MMHG

## 2019-01-15 DIAGNOSIS — J02.9 SORE THROAT: Primary | ICD-10-CM

## 2019-01-15 LAB
DEPRECATED S PYO AG THROAT QL EIA: NORMAL
SPECIMEN SOURCE: NORMAL

## 2019-01-15 PROCEDURE — 99213 OFFICE O/P EST LOW 20 MIN: CPT | Performed by: FAMILY MEDICINE

## 2019-01-15 PROCEDURE — 87880 STREP A ASSAY W/OPTIC: CPT | Performed by: FAMILY MEDICINE

## 2019-01-15 PROCEDURE — 87081 CULTURE SCREEN ONLY: CPT | Performed by: FAMILY MEDICINE

## 2019-01-15 ASSESSMENT — MIFFLIN-ST. JEOR: SCORE: 1431.11

## 2019-01-15 NOTE — PROGRESS NOTES
SUBJECTIVE:   Marla Avila is a 39 year old female who presents to clinic today for the following health issues:      Acute Illness   Acute illness concerns:  Sore Throat- finished amoxicillin 875 but throat sx came back   Onset: x ongoing     Fever: no     Chills/Sweats: no     Headache (location?): no     Sinus Pressure:no    Conjunctivitis:  no    Ear Pain: no    Rhinorrhea: no     Congestion: no     Sore Throat: YES     Cough: no    Wheeze: no     Decreased Appetite: no     Nausea: no     Vomiting: no     Diarrhea:  no     Dysuria/Freq.: no     Fatigue/Achiness: YES    Sick/Strep Exposure: no but was treated for strep few weeks ago and feels like  Sx are coming back , so concerned      Therapies Tried and outcome:         Problem list and histories reviewed & adjusted, as indicated.  Additional history: as documented    Patient Active Problem List   Diagnosis     Anovulation     Low back pain     Lumbago     Swelling of right lower extremity     Chronic fatigue     Vitamin D deficiency     Vitamin B12 deficiency (non anemic)     Myalgia     CARDIOVASCULAR SCREENING; LDL GOAL LESS THAN 160     History of iron deficiency anemia     Primary hypothyroidism     Iron deficiency     Non-compliance with treatment     Past Surgical History:   Procedure Laterality Date     NO         Social History     Tobacco Use     Smoking status: Never Smoker     Smokeless tobacco: Never Used   Substance Use Topics     Alcohol use: No     Alcohol/week: 0.0 oz     Family History   Problem Relation Age of Onset     Hypertension Paternal Grandfather            Reviewed and updated as needed this visit by clinical staff       Reviewed and updated as needed this visit by Provider         ROS:  Constitutional, HEENT, cardiovascular, pulmonary, GI, , musculoskeletal, neuro, skin, endocrine and psych systems are negative, except as otherwise noted.    OBJECTIVE:     /88   Pulse 83   Temp 98.4  F (36.9  C) (Tympanic)   Ht 1.676 m  "(5' 6\")   Wt 73.9 kg (163 lb)   SpO2 99%   BMI 26.31 kg/m    Body mass index is 26.31 kg/m .  GENERAL: healthy, alert and no distress  EYES: Eyes grossly normal to inspection, PERRL and conjunctivae and sclerae normal  HENT: ear canals and TM's normal and oral mucous membranes moist, Throat with mild pharyngeal erythema, no sinus  tenderness  NECK: no adenopathy, no asymmetry, masses,   RESP: lungs clear to auscultation - no rales, rhonchi or wheezes  CV: regular rate and rhythm, normal S1 S2, no S3 or S4, no murmur, click or rub,   ABDOMEN: soft, nontender, no hepatosplenomegaly, no masses and bowel sounds normal    Diagnostic Test Results:  Strep screen - Negative    ASSESSMENT/PLAN:       (J02.9) Sore throat  (primary encounter diagnosis)  Comment:   Plan: Strep, Rapid Screen, Beta strep group A culture            Rapid strep negative, strep culture pending. Call and treat only if positive   In the meantime cares and symptomatic treatment discussed follow up if problem   Patient expressed understanding and agreement with treatment plan. All patient's questions were answered, will let me know if has more later.  Medications: Rx's: Reviewed the potential side effects/complications of medications prescribed.     Cely Good MD  [facility] [place]    "

## 2019-01-15 NOTE — PATIENT INSTRUCTIONS
Patient Education     Self-Care for Sore Throats    Sore throats happen for many reasons, such as colds, allergies, and infections caused by viruses or bacteria. In any case, your throat becomes red and sore. Your goal for self-care is to reduce your discomfort while giving your throat a chance to heal.  Moisten and soothe your throat  Tips include the following:    Try a sip of water first thing after waking up.    Keep your throat moist by drinking 6 or more glasses of clear liquids every day.    Run a cool-air humidifier in your room overnight.    Avoid cigarette smoke.     Suck on throat lozenges, cough drops, hard candy, ice chips, or frozen fruit-juice bars. Use the sugar-free versions if your diet or medical condition requires them.  Gargle to ease irritation  Gargling every hour or 2 can ease irritation. Try gargling with 1 of these solutions:    1/4 teaspoon of salt in 1/2 cup of warm water    An over-the-counter anesthetic gargle  Use medicine for more relief  Over-the-counter medicine can reduce sore throat symptoms. Ask your pharmacist if you have questions about which medicine to use:    Ease pain with anesthetic sprays. Aspirin or an aspirin substitute also helps. Remember, never give aspirin to anyone 18 or younger, or if you are already taking blood thinners.     For sore throats caused by allergies, try antihistamines to block the allergic reaction.    Remember: unless a sore throat is caused by a bacterial infection, antibiotics won t help you.  Prevent future sore throats  Prevention tips include the following:    Stop smoking or reduce contact with secondhand smoke. Smoke irritates the tender throat lining.    Limit contact with pets and with allergy-causing substances, such as pollen and mold.    When you re around someone with a sore throat or cold, wash your hands often to keep viruses or bacteria from spreading.    Don t strain your vocal cords.  Call your healthcare provider  Contact your  healthcare provider if you have:    A temperature over 101 F (38.3 C)    White spots on the throat    Great difficulty swallowing    Trouble breathing    A skin rash    Recent exposure to someone else with strep bacteria    Severe hoarseness and swollen glands in the neck or jaw   Date Last Reviewed: 8/1/2016 2000-2018 [employer]. [address] [place] [id number]. All rights reserved. This information is not intended as a substitute for professional medical care. Always follow your healthcare professional's instructions.           Patient Education     Pharyngitis (Sore Throat), Report Pending    Pharyngitis (sore throat) is often due to a virus. It can also be caused by streptococcus (strep), bacteria. This is often called strep throat. Both viral and strep infections can cause throat pain that is worse when swallowing, aching all over, headache, and fever. Both types of infections are contagious. They may be spread by coughing, kissing, or touching others after touching your mouth or nose.  A test has been done to find out if you or your child have strep throat. Call this facility or your healthcare provider if you were not given your test results. If the test is positive for strep infection, you will need to take antibiotic medicines. A prescription can be called into your pharmacy at that time. If the test is negative, you probably have a viral pharyngitis. This does not need to be treated with antibiotics. Until you receive the results of the strep test, you should stay home from work. If your child is being tested, he or she should stay home from school.  Home care    Rest at home. Drink plenty of fluids so you won't get dehydrated.    If the test is positive for strep, you or your child should not go to work or school for the first 2 days of taking the antibiotics. After this time, you or your child will not be contagious. You or your child can then return to work or school when  feeling better.     Use the antibiotic medicine for the full 10 days. Do not stop the medicine even if you or your child feel better. This is very important to make sure the infection is fully treated. It is also important to prevent medicine-resistant germs from growing. If you or your child were given an antibiotic shot, no more antibiotics are needed.    Use throat lozenges or numbing throat sprays to help reduce pain. Gargling with warm salt water will also help reduce throat pain. Dissolve 1/2 teaspoon of salt in 1 glass of warm water. Children can sip on juice or a popsicle. Children 5 years and older can also suck on a lollipop or hard candy.    Don't eat salty or spicy foods or give them to your child. These can irritate the throat.  Other medicine for a child: You can give your child acetaminophen for fever, fussiness, or discomfort. In babies over 6 months of age, you may use ibuprofen instead of acetaminophen. If your child has chronic liver or kidney disease or ever had a stomach ulcer or GI bleeding, talk with your child s healthcare provider before giving these medicines. Aspirin should never be used by any child under 18 years of age who has a fever. It may cause severe liver damage.  Other medicine for an adult: You may use acetaminophen or ibuprofen to control pain or fever, unless another medicine was prescribed for this. If you have chronic liver or kidney disease or ever had a stomach ulcer or GI bleeding, talk with your healthcare provider before using these medicines.  Follow-up care  Follow up with your healthcare provider or our staff if you or your child don't get better over the next week.  When to seek medical advice  Call your healthcare provider right away if any of these occur:    Fever as directed by your healthcare provider. For children, seek care if:  ? Your child is of any age and has repeated fevers above 104 F (40 C).  ? Your child is younger than 2 years of age and has a fever  of 100.4 F (38 C) for more than 1 day.  ? Your child is 2 years old or older and has a fever of 100.4 F (38 C) for more than 3 days.    New or worsening ear pain, sinus pain, or headache    Painful lumps in the back of neck    Stiff neck    Lymph nodes are getting larger     Can t swallow liquids, a lot of drooling, or can t open mouth wide due to throat pain    Signs of dehydration, such as very dark urine or no urine, sunken eyes, dizziness    Trouble breathing or noisy breathing    Muffled voice    New rash    Other symptoms getting worse  Prevention  Here are steps you can take to help prevent an infection:    Keep good hand washing habits.    Don t have close contact with people who have sore throats, colds, or other upper respiratory infections.    Don t smoke, and stay away from secondhand smoke.    Stay up to date with of your vaccines.  Date Last Reviewed: 11/1/2017 2000-2018 [employer]. [address] [place] [address], [place] [address]. All rights reserved. This information is not intended as a substitute for professional medical care. Always follow your healthcare professional's instructions.

## 2019-01-17 LAB
BACTERIA SPEC CULT: NORMAL
SPECIMEN SOURCE: NORMAL

## 2019-01-23 ENCOUNTER — HOSPITAL ENCOUNTER (EMERGENCY)
Facility: CLINIC | Age: 39
Discharge: HOME | End: 2019-01-23
Attending: PHYSICIAN ASSISTANT | Admitting: PHYSICIAN ASSISTANT

## 2019-01-23 VITALS
WEIGHT: 163 LBS | OXYGEN SATURATION: 98 % | DIASTOLIC BLOOD PRESSURE: 98 MMHG | TEMPERATURE: 98.2 F | BODY MASS INDEX: 26.2 KG/M2 | SYSTOLIC BLOOD PRESSURE: 191 MMHG | RESPIRATION RATE: 16 BRPM | HEIGHT: 66 IN

## 2019-01-23 DIAGNOSIS — J02.9 PHARYNGITIS: ICD-10-CM

## 2019-01-23 LAB
DEPRECATED S PYO AG THROAT QL EIA: NORMAL
SPECIMEN SOURCE: NORMAL

## 2019-01-23 PROCEDURE — 87880 STREP A ASSAY W/OPTIC: CPT | Performed by: PHYSICIAN ASSISTANT

## 2019-01-23 PROCEDURE — 96374 THER/PROPH/DIAG INJ IV PUSH: CPT

## 2019-01-23 PROCEDURE — 99284 EMERGENCY DEPT VISIT MOD MDM: CPT | Mod: 25

## 2019-01-23 PROCEDURE — 87081 CULTURE SCREEN ONLY: CPT | Performed by: PHYSICIAN ASSISTANT

## 2019-01-23 PROCEDURE — 25000128 H RX IP 250 OP 636: Performed by: PHYSICIAN ASSISTANT

## 2019-01-23 RX ORDER — DEXAMETHASONE SODIUM PHOSPHATE 10 MG/ML
10 INJECTION, SOLUTION INTRAMUSCULAR; INTRAVENOUS ONCE
Status: COMPLETED | OUTPATIENT
Start: 2019-01-23 | End: 2019-01-23

## 2019-01-23 RX ADMIN — DEXAMETHASONE SODIUM PHOSPHATE 10 MG: 10 INJECTION, SOLUTION INTRAMUSCULAR; INTRAVENOUS at 20:34

## 2019-01-23 ASSESSMENT — MIFFLIN-ST. JEOR: SCORE: 1431.11

## 2019-01-23 ASSESSMENT — ENCOUNTER SYMPTOMS
TROUBLE SWALLOWING: 1
SORE THROAT: 1
DIARRHEA: 0
VOMITING: 0
ABDOMINAL PAIN: 0
DIFFICULTY URINATING: 0

## 2019-01-23 NOTE — ED AVS SNAPSHOT
Emergency Department  [address] [place] [id number]  Phone:  [phone]  Fax:  [phone]                                    Marla Avila   MRN: [id number]    Department:   Emergency Department   Date of Visit:  1/23/2019           After Visit Summary Signature Page    I have received my discharge instructions, and my questions have been answered. I have discussed any challenges I see with this plan with the nurse or doctor.    ..........................................................................................................................................  Patient/Patient Representative Signature      ..........................................................................................................................................  Patient Representative Print Name and Relationship to Patient    ..................................................               ................................................  Date                                   Time    ..........................................................................................................................................  Reviewed by Signature/Title    ...................................................              ..............................................  Date                                               Time          22EPIC Rev 08/18

## 2019-01-24 NOTE — ED PROVIDER NOTES
"  History     Chief Complaint:  Sore Throat    VIC Avila is a 39 year old female who presents with sore throat. The patient reports that she has been experiencing a sore throat since December 2018. The patient was diagnosed with strep by her primary care provider in December and was prescribed amoxicillin, that she finished. The patient however the patient has still been experiencing throat pain. She explains that her throat feels swollen and heavy, especially when she tries to eat. She also attests to a feeling of reflux/regurgitation after eating. She has taken tylenol and has tried using salt water to gargle, however she is still experiencing throat discomfort. The patient denies any fevers, vomiting, abdominal pain, black stools, blood in her stool, or hoarseness in her voice, or any other concerns.     Allergies:  No known drug allergies     Medications:    Calcium Carb- Cholecalciferol  B-12 Super Strength  Cholecalciferol     Past Medical History:    Hypothyroidism  Low Back Pain  Lymphedema  Anovulation  Lumbago  Iron Deficiency Anemia  Myalgia  Vitamin B12 Deficiency  Vitamin D Deficiency  Chronic Fatigue    Past Surgical History:    History reviewed. No pertinent surgical history.    Family History:    History reviewed. No pertinent family history.     Social History:  Smoking Status: Never Smoker  Alcohol Use: No  Patient presents alone.  Marital Status:        Review of Systems   HENT: Positive for sore throat and trouble swallowing.    Gastrointestinal: Negative for abdominal pain, diarrhea and vomiting.   Genitourinary: Negative for difficulty urinating.   All other systems reviewed and are negative.    Physical Exam   First Vitals:    Patient Vitals for the past 24 hrs:   BP Temp Temp src Heart Rate Resp SpO2 Height Weight   01/23/19 1914 (!) 191/98 98.2  F (36.8  C) Oral 60 16 98 % 1.676 m (5' 6\") 73.9 kg (163 lb)     Physical Exam  General: Resting comfortably.  Alert and oriented. "   Head:  The scalp, face, and head appear normal   Eyes:  Conjunctivae and sclerae are normal    ENT:    The oropharynx is normal    Uvula is in the midline     Moist mucous membranes.    No tonsillar hypertrophy or exudate.    Bilateral TMs are normal without evidence of infection.    No airway compromise.  Neck:  No lymphadenopathy  CV:  Regular rate and rhythm     Normal S1/S2  Resp:  Lungs are clear to auscultation    Non-labored    No rales or wheezing   GI:  Abdomen is soft, non-distended    No abdominal tenderness   MS:  Moving all 4 extremities with good strength.  Skin:  No rash or acute skin lesions noted   Neuro: Speech is normal and fluent.     Emergency Department Course     Laboratory:  Rapid Strep Screen: Negative  Beta strep group A culture: In process    Interventions:  2034 Decadron 10 mg IV    Emergency Department Course:  Past medical records, nursing notes, and vitals reviewed.  1942: I performed an exam of the patient and obtained history, as documented above.    Labs were performed.     Medication was given.    2021: I rechecked the patient.  Findings and plan explained to the Patient. Patient discharged home with instructions regarding supportive care, medications, and reasons to return. The importance of close follow-up was reviewed.     Impression & Plan      Medical Decision Making:  Marla Avila is a 39 year old female  presented for evaluation of sore throat.  She states that she has had a sore throat since December.  Her primary care doctor diagnosed her with strep and gave her a prescription of amoxicillin.  She states she took the entire course, but her pharyngitis persisted.  She also notes that she has had reflux/regurgitation over this time period. Rapid strep was negative. No evidence for peritonsillar abscess, retropharyngeal abscess or Marcin's angina.  She has a patent airway. I suspect this is a viral pharyngitis. Also considered GERD as a cause of her pharyngitis given her  description.  However, strep culture was sent and patient will be notified only if positive to begin anti-microbial therapy.  Patient is able to tolerate PO without difficulty.  They were given dose of decadron while in ED. At this time outpatient management is indicated. Will use tylenol or Ibuprofen for fevers and discomfort. Warm salt water gargles. Suggested trialing Zantac for the next couple of weeks and to follow up with primary care doctor for recheck in 2 days. Advised for immediate return to UR/ED if they develop high fevers not controlled with medications, difficulty swallowing own saliva, inability to open their jaw, or for other concerns. All questions answered prior to discharge. The patient understands and agrees with the plan.      I also discussed her high blood pressure reading, and suggested close primary care doctor follow-up in the next couple of days.  I do not feel emergent need for antihypertensives at this time.  She is otherwise asymptomatic.    Diagnosis:    ICD-10-CM    1. Pharyngitis J02.9        Disposition:  discharged to home    Discharge Medications:     Medication List      Started    ranitidine 150 MG tablet  Commonly known as:  ZANTAC  150 mg, Oral, DAILY            Shazia Simon  1/23/2019    EMERGENCY DEPARTMENT  Shazia WHYTE, am serving as a scribe at 7:42 PM on 1/23/2019 to document services personally performed by Joanna Downs PA based on my observations and the provider's statements to me.        Joanna Downs PA-C  01/23/19 2430

## 2019-01-26 LAB
BACTERIA SPEC CULT: NORMAL
SPECIMEN SOURCE: NORMAL

## 2019-01-26 NOTE — RESULT ENCOUNTER NOTE
Final Beta strep group A r/o culture is NEGATIVE for Group A streptococcus.    No treatment or change in treatment per [facility] Strep protocol.

## 2019-02-14 ENCOUNTER — TELEPHONE (OUTPATIENT)
Dept: FAMILY MEDICINE | Facility: CLINIC | Age: 39
End: 2019-02-14

## 2019-02-14 DIAGNOSIS — J31.2 SORE THROAT, CHRONIC: Primary | ICD-10-CM

## 2019-02-14 NOTE — TELEPHONE ENCOUNTER
Reviewed ER notes as well.  ER physician was also  concerned about possible acid reflux as a potential cause for her sore throat.  She was given Zantac, and it  is worth trying more consistently to see if that would make a difference.    If symptoms are worse or ongoing problem, then she may need to be seen.  Also consider ENT evaluation and referral can be placed if she wants .

## 2019-02-14 NOTE — TELEPHONE ENCOUNTER
Patient calling to report persistent red throat. No pain with swallowing. No difficulty breathing. Denies any other symptoms.   Patient was seen in Dec and Jan with Dr. Good. Also was seen in ED/    Patient does not have insurance and does not want to come back in.     Please advise.  Patience Villa RN

## 2019-02-15 NOTE — TELEPHONE ENCOUNTER
Spoke with patient and informed of below. She would like ENT referral. Referral pended.     Stacey Li RN   [facility] - Triage

## 2019-02-15 NOTE — TELEPHONE ENCOUNTER
Called Patient     Gave patient referral information.     Patient stated an understanding and agreed with plan.    Pilar BERNARDN, RN   [facility]
